# Patient Record
Sex: FEMALE | Race: WHITE | Employment: FULL TIME | URBAN - METROPOLITAN AREA
[De-identification: names, ages, dates, MRNs, and addresses within clinical notes are randomized per-mention and may not be internally consistent; named-entity substitution may affect disease eponyms.]

---

## 2017-10-16 PROCEDURE — 88175 CYTOPATH C/V AUTO FLUID REDO: CPT | Performed by: NURSE PRACTITIONER

## 2017-10-18 PROCEDURE — 82570 ASSAY OF URINE CREATININE: CPT | Performed by: NURSE PRACTITIONER

## 2017-10-18 PROCEDURE — 82043 UR ALBUMIN QUANTITATIVE: CPT | Performed by: NURSE PRACTITIONER

## 2017-10-23 PROBLEM — E11.319: Status: ACTIVE | Noted: 2017-10-23

## 2017-10-23 PROBLEM — IMO0001 UNCONTROLLED DIABETES MELLITUS TYPE 2 WITHOUT COMPLICATIONS: Status: ACTIVE | Noted: 2017-10-23

## 2017-10-23 PROBLEM — IMO0002: Status: ACTIVE | Noted: 2017-10-23

## 2017-10-23 PROBLEM — E11.65: Status: ACTIVE | Noted: 2017-10-23

## 2017-11-21 ENCOUNTER — HOSPITAL ENCOUNTER (OUTPATIENT)
Dept: MAMMOGRAPHY | Facility: HOSPITAL | Age: 52
Discharge: HOME | End: 2017-11-21
Attending: NURSE PRACTITIONER
Payer: COMMERCIAL

## 2017-11-21 DIAGNOSIS — Z12.31 ENCOUNTER FOR SCREENING MAMMOGRAM FOR MALIGNANT NEOPLASM OF BREAST: ICD-10-CM

## 2017-11-21 PROCEDURE — 77067 SCR MAMMO BI INCL CAD: CPT | Performed by: NURSE PRACTITIONER

## 2017-12-29 ENCOUNTER — CHARTING TRANS (OUTPATIENT)
Dept: OTHER | Age: 52
End: 2017-12-29

## 2017-12-29 ENCOUNTER — LAB SERVICES (OUTPATIENT)
Dept: OTHER | Age: 52
End: 2017-12-29

## 2017-12-29 LAB — RAPID STREP GROUP A: NORMAL

## 2018-07-05 PROBLEM — E11.319: Status: ACTIVE | Noted: 2017-10-23

## 2018-11-02 VITALS
DIASTOLIC BLOOD PRESSURE: 76 MMHG | OXYGEN SATURATION: 99 % | TEMPERATURE: 97.8 F | HEIGHT: 66 IN | WEIGHT: 166.15 LBS | BODY MASS INDEX: 26.7 KG/M2 | RESPIRATION RATE: 18 BRPM | HEART RATE: 93 BPM | SYSTOLIC BLOOD PRESSURE: 122 MMHG

## 2019-07-05 ENCOUNTER — LAB ENCOUNTER (OUTPATIENT)
Dept: LAB | Facility: HOSPITAL | Age: 54
End: 2019-07-05
Attending: NURSE PRACTITIONER
Payer: COMMERCIAL

## 2019-07-05 DIAGNOSIS — E78.5 HYPERLIPIDEMIA, UNSPECIFIED HYPERLIPIDEMIA TYPE: ICD-10-CM

## 2019-07-05 DIAGNOSIS — E11.319 CONTROLLED TYPE 2 DIABETES MELLITUS WITH RETINOPATHY OF BOTH EYES, WITHOUT LONG-TERM CURRENT USE OF INSULIN, MACULAR EDEMA PRESENCE UNSPECIFIED, UNSPECIFIED RETINOPATHY SEVERITY (HCC): ICD-10-CM

## 2019-07-05 LAB
CHOLEST SMN-MCNC: 161 MG/DL (ref ?–200)
EST. AVERAGE GLUCOSE BLD GHB EST-MCNC: 126 MG/DL (ref 68–126)
HBA1C MFR BLD HPLC: 6 % (ref ?–5.7)
HDLC SERPL-MCNC: 78 MG/DL (ref 40–59)
LDLC SERPL CALC-MCNC: 68 MG/DL (ref ?–100)
NONHDLC SERPL-MCNC: 83 MG/DL (ref ?–130)
PATIENT FASTING: NO
TRIGL SERPL-MCNC: 77 MG/DL (ref 30–149)
VLDLC SERPL CALC-MCNC: 15 MG/DL (ref 0–30)

## 2019-07-05 PROCEDURE — 80061 LIPID PANEL: CPT

## 2019-07-05 PROCEDURE — 36415 COLL VENOUS BLD VENIPUNCTURE: CPT

## 2019-07-05 PROCEDURE — 83036 HEMOGLOBIN GLYCOSYLATED A1C: CPT

## 2019-07-08 NOTE — PROGRESS NOTES
Please let her know A1c is 6.0 and cholesterol is better on medication. She needs a follow up visit with me.  I have not seen her since November

## 2019-10-23 ENCOUNTER — HOSPITAL ENCOUNTER (OUTPATIENT)
Dept: GENERAL RADIOLOGY | Facility: HOSPITAL | Age: 54
Discharge: HOME | End: 2019-10-23
Attending: NURSE PRACTITIONER
Payer: COMMERCIAL

## 2019-10-23 ENCOUNTER — HOSPITAL ENCOUNTER (OUTPATIENT)
Dept: MAMMOGRAPHY | Facility: HOSPITAL | Age: 54
Discharge: HOME | End: 2019-10-23
Attending: INTERNAL MEDICINE
Payer: COMMERCIAL

## 2019-10-23 DIAGNOSIS — Z12.31 SCREENING MAMMOGRAM, ENCOUNTER FOR: ICD-10-CM

## 2019-10-23 DIAGNOSIS — M53.3 PAIN IN THE COCCYX: ICD-10-CM

## 2019-10-23 DIAGNOSIS — E11.319 CONTROLLED TYPE 2 DIABETES MELLITUS WITH RETINOPATHY OF BOTH EYES, WITHOUT LONG-TERM CURRENT USE OF INSULIN, MACULAR EDEMA PRESENCE UNSPECIFIED, UNSPECIFIED RETINOPATHY SEVERITY (HCC): ICD-10-CM

## 2019-10-23 DIAGNOSIS — M54.2 CERVICAL PAIN (NECK): ICD-10-CM

## 2019-10-23 PROCEDURE — 72220 X-RAY EXAM SACRUM TAILBONE: CPT | Performed by: NURSE PRACTITIONER

## 2019-10-23 PROCEDURE — 77063 BREAST TOMOSYNTHESIS BI: CPT | Performed by: NURSE PRACTITIONER

## 2019-10-23 PROCEDURE — 77067 SCR MAMMO BI INCL CAD: CPT | Performed by: NURSE PRACTITIONER

## 2019-12-04 PROBLEM — Z80.0 FAMILY HISTORY OF COLON CANCER: Status: ACTIVE | Noted: 2019-12-04

## 2021-02-12 PROBLEM — B00.1 COLD SORE: Status: ACTIVE | Noted: 2021-02-12

## 2021-09-03 ENCOUNTER — HOSPITAL ENCOUNTER (OUTPATIENT)
Dept: MAMMOGRAPHY | Facility: HOSPITAL | Age: 56
Discharge: HOME | End: 2021-09-03
Attending: NURSE PRACTITIONER
Payer: COMMERCIAL

## 2021-09-03 ENCOUNTER — HOSPITAL ENCOUNTER (OUTPATIENT)
Dept: GENERAL RADIOLOGY | Facility: HOSPITAL | Age: 56
Discharge: HOME | End: 2021-09-03
Attending: NURSE PRACTITIONER
Payer: COMMERCIAL

## 2021-09-03 DIAGNOSIS — R52 PAIN: ICD-10-CM

## 2021-09-03 DIAGNOSIS — Z12.31 ENCOUNTER FOR SCREENING MAMMOGRAM FOR MALIGNANT NEOPLASM OF BREAST: ICD-10-CM

## 2021-09-03 PROCEDURE — 77063 BREAST TOMOSYNTHESIS BI: CPT | Performed by: NURSE PRACTITIONER

## 2021-09-03 PROCEDURE — 77067 SCR MAMMO BI INCL CAD: CPT | Performed by: NURSE PRACTITIONER

## 2021-09-03 PROCEDURE — 73562 X-RAY EXAM OF KNEE 3: CPT | Performed by: NURSE PRACTITIONER

## 2021-10-22 ENCOUNTER — LAB ENCOUNTER (OUTPATIENT)
Dept: LAB | Facility: HOSPITAL | Age: 56
End: 2021-10-22
Attending: NURSE PRACTITIONER
Payer: COMMERCIAL

## 2021-10-22 DIAGNOSIS — R00.2 PALPITATION: ICD-10-CM

## 2021-10-22 DIAGNOSIS — R07.89 CHEST PRESSURE: ICD-10-CM

## 2021-10-22 DIAGNOSIS — Z13.6 SCREENING FOR HEART DISEASE: ICD-10-CM

## 2021-10-22 DIAGNOSIS — E78.5 HYPERLIPIDEMIA, UNSPECIFIED HYPERLIPIDEMIA TYPE: ICD-10-CM

## 2021-10-22 DIAGNOSIS — E11.319 CONTROLLED TYPE 2 DIABETES MELLITUS WITH RETINOPATHY OF BOTH EYES, WITHOUT LONG-TERM CURRENT USE OF INSULIN, MACULAR EDEMA PRESENCE UNSPECIFIED, UNSPECIFIED RETINOPATHY SEVERITY (HCC): ICD-10-CM

## 2021-10-22 PROCEDURE — 80048 BASIC METABOLIC PNL TOTAL CA: CPT

## 2021-10-22 PROCEDURE — 83735 ASSAY OF MAGNESIUM: CPT

## 2021-10-22 PROCEDURE — 84443 ASSAY THYROID STIM HORMONE: CPT

## 2021-10-22 PROCEDURE — 84484 ASSAY OF TROPONIN QUANT: CPT

## 2021-10-22 PROCEDURE — 36415 COLL VENOUS BLD VENIPUNCTURE: CPT

## 2024-03-09 ENCOUNTER — HOSPITAL ENCOUNTER (OUTPATIENT)
Dept: MAMMOGRAPHY | Facility: HOSPITAL | Age: 59
Discharge: HOME | End: 2024-03-09
Attending: NURSE PRACTITIONER
Payer: COMMERCIAL

## 2024-03-09 DIAGNOSIS — Z12.31 ENCOUNTER FOR MAMMOGRAM TO ESTABLISH BASELINE MAMMOGRAM: ICD-10-CM

## 2024-03-09 PROCEDURE — 77067 SCR MAMMO BI INCL CAD: CPT | Performed by: NURSE PRACTITIONER

## 2024-03-09 PROCEDURE — 77063 BREAST TOMOSYNTHESIS BI: CPT | Performed by: NURSE PRACTITIONER

## 2025-02-27 ENCOUNTER — OFFICE VISIT (OUTPATIENT)
Dept: FAMILY MEDICINE CLINIC | Facility: CLINIC | Age: 60
End: 2025-02-27
Payer: COMMERCIAL

## 2025-02-27 ENCOUNTER — NURSE ONLY (OUTPATIENT)
Dept: LAB | Age: 60
End: 2025-02-27
Attending: NURSE PRACTITIONER
Payer: COMMERCIAL

## 2025-02-27 VITALS
OXYGEN SATURATION: 99 % | HEART RATE: 70 BPM | TEMPERATURE: 99 F | RESPIRATION RATE: 16 BRPM | DIASTOLIC BLOOD PRESSURE: 70 MMHG | BODY MASS INDEX: 28.77 KG/M2 | WEIGHT: 179 LBS | HEIGHT: 66 IN | SYSTOLIC BLOOD PRESSURE: 112 MMHG

## 2025-02-27 DIAGNOSIS — J06.9 VIRAL URI WITH COUGH: ICD-10-CM

## 2025-02-27 DIAGNOSIS — J06.9 VIRAL URI WITH COUGH: Primary | ICD-10-CM

## 2025-02-27 LAB
POCT INFLUENZA A: NEGATIVE
POCT INFLUENZA B: NEGATIVE

## 2025-02-27 PROCEDURE — 99203 OFFICE O/P NEW LOW 30 MIN: CPT | Performed by: NURSE PRACTITIONER

## 2025-02-27 PROCEDURE — 87502 INFLUENZA DNA AMP PROBE: CPT

## 2025-02-27 RX ORDER — BENZONATATE 200 MG/1
200 CAPSULE ORAL 3 TIMES DAILY PRN
Qty: 30 CAPSULE | Refills: 0 | Status: SHIPPED | OUTPATIENT
Start: 2025-02-27

## 2025-02-27 RX ORDER — ALBUTEROL SULFATE 90 UG/1
2 INHALANT RESPIRATORY (INHALATION)
Qty: 1 EACH | Refills: 0 | Status: SHIPPED | OUTPATIENT
Start: 2025-02-27

## 2025-02-27 NOTE — PATIENT INSTRUCTIONS
Instruction for viral upper respiratory infections:  Your child has a viral upper respiratory illness (URI), which is another term for the common cold. The virus is contagious during the first 4-5 days. It is spread through the air by coughing, sneezing, or by direct contact (touching your sick child then touching your own eyes, nose, or mouth). Sore throat is a common accompanying symptom. Frequent handwashing will decrease risk of spread. Most viral illnesses resolve within 7 to 14 days with rest and simple home remedies. However, they may sometimes last up to 4 weeks. Expect the cough to gradually worsen the first 4-5 days, then peak and slowly go away. The nasal mucous can become thick, yellow or yellow/green during the last half of the cold (but should not last past day 14 of the cold). Antibiotics will not kill a virus and are not prescribed for this condition.    General comfort measures:  Get rest!  Hydrate! (cold or hot based on comfort). Drink lots of water or other non dehydrating liquids to help with illness. Salty foods, soups and tea can help with throat pain.   Hand washing-use hand  or wash hands frequently, cover your cough or sneeze, do not share towels or drinks with others.  Salt water gargles (1 tsp. Salt in 6 oz lukewarm water): gargle for 2 minutes, repeat every 15 minutes as needed to help decrease swelling and relieve pain.  Use humidified air, steamy showers/baths and use vaporizer in sleeping quarters to keep secretions thin.  Avoid smoking.    Symptom management:    Nasal congestion/Post-nasal-drip: Saline nasal spray to nostrils to help remove drainage or an antihistamine to help dry up drainage.    Sinus congestion/Post-nasal-drip: OTC Nasacort or Flonase (steroid nasal spray) nightly for 2 weeks. May take Sudafed (D) or Sudafed-PE, if not contraindicated (do not take if you have HTN).   Pain/discomfort:  May use Tylenol or Ibuprofen, if not contraindicated.  Cough:  May take  DM-dextromethorophan over the counter (long lasting). Ex: Delsym  Chest congestion:  May take guaifenesin with a lot of water.  Ex:  Plain Mucinex.  Sore throat:  Cepacol lozenges or Chloroseptic throat spray (active ingredient Benzocaine).      Follow up with your PCP in 1-2 weeks if not better.  Follow up in a few days if worsening symptoms. Seek immediate care if inability to swallow or breathe.

## 2025-02-27 NOTE — PROGRESS NOTES
CHIEF COMPLAINT:     Chief Complaint   Patient presents with    Cough     Deep congestion that could be bronchitis or pneumonia - Entered by patient  Started Monday; productive cough, chest congestion, tested negative for Covid at home        HPI:   Cyndy Julien is a 59 year old female who presents for upper respiratory symptoms for  3 days. Patient reports congestion, cough .  Associated symptoms include fatigue, body aches, headache, runny nose, dry to productive cough, occ wheezing, poor sleep last night d/t cough.  Denies fever, sob, chest/back pain.  Has hx of pneumonia/bronchitis, maybe 3 yrs ago.  Symptoms have been progressing since onset.  Treating symptoms with mucinex-dm.       + hx of COVID 2 yrs ago; No known COVID exposure. Home covid test 2 days ago was negative.  Son was sick last week with similar symptoms.  + flu shot.      Other conditions:   BMI: Body mass index is 28.89 kg/m².  Heart Disease: no  DM: yes  Chronic Lung Disease: no, former smoker.  Chronic Kidney Disease: no  Liver Disease: no  Immunocompromised: (Transplant, Heme-Onc, Chronic Steroid Use): no    Current Outpatient Medications   Medication Sig Dispense Refill    rosuvastatin 10 MG Oral Tab Take 1 tablet (10 mg total) by mouth nightly. 90 tablet 3    metFORMIN HCl  MG Oral Tablet 24 Hr TAKE 1 TABLET BY MOUTH TWICE DAILY WITH MEALS 180 tablet 3    Glucose Blood (ONETOUCH VERIO) In Vitro Strip CHECK GLUCOSE THREE TO FOUR TIMES DAILY AS DIRECTED 150 strip 3    aspirin 81 MG Oral Tab EC Take 1 tablet (81 mg total) by mouth daily. 1 tablet 0      Past Medical History:    Diabetes (HCC)    History of pneumonia    per [facility]:      Past Surgical History:   Procedure Laterality Date    Cataract      Colonoscopy  12/2013    Colonoscopy  12/2019    Colonoscopy N/A 12/4/2019    Procedure: COLONOSCOPY, POSSIBLE BIOPSY, POSSIBLE POLYPECTOMY 48651;  Surgeon: Naresh Stewart MD;  [place] [facility]    Lasik      per  [facility]:    Tubal ligation  1996         Social History     Socioeconomic History    Marital status:    Tobacco Use    Smoking status: Former    Smokeless tobacco: Never    Tobacco comments:     30 years ago   Vaping Use    Vaping status: Never Used   Substance and Sexual Activity    Alcohol use: Yes     Alcohol/week: 0.8 standard drinks of alcohol     Types: 1 Standard drinks or equivalent per week    Drug use: Never    Sexual activity: Yes     Partners: Male   Other Topics Concern    Caffeine Concern Yes     Comment: coffee, 4 cups         REVIEW OF SYSTEMS:   GENERAL: feels well otherwise,   adequate appetite  SKIN: no rashes or abnormal skin lesions  HEENT: See HPI  LUNGS: denies shortness of breath, See HPI  CARDIOVASCULAR: denies chest pain or palpitations   GI: denies N/V/C or abdominal pain  NEURO: denies dizziness or lightheadedness    EXAM:   /70   Pulse 70   Temp 98.5 °F (36.9 °C) (Oral)   Resp 16   Ht 5' 6\" (1.676 m)   Wt 179 lb (81.2 kg)   LMP 09/11/2020   SpO2 99%   BMI 28.89 kg/m²     Physical Exam  Vitals reviewed.   Constitutional:       General: She is not in acute distress.     Appearance: Normal appearance. She is not ill-appearing.   HENT:      Head: Normocephalic and atraumatic.      Right Ear: Tympanic membrane and ear canal normal.      Left Ear: Tympanic membrane and ear canal normal.      Nose: Congestion and rhinorrhea present. Rhinorrhea is clear.      Right Sinus: No maxillary sinus tenderness or frontal sinus tenderness.      Left Sinus: No maxillary sinus tenderness or frontal sinus tenderness.      Mouth/Throat:      Lips: Pink.      Mouth: Mucous membranes are moist.      Pharynx: Oropharynx is clear. Uvula midline. Postnasal drip present. No posterior oropharyngeal erythema.   Eyes:      Extraocular Movements: Extraocular movements intact.      Conjunctiva/sclera: Conjunctivae normal.   Cardiovascular:      Rate and Rhythm: Normal rate and regular rhythm.       Heart sounds: Normal heart sounds. No murmur heard.  Pulmonary:      Effort: Pulmonary effort is normal.      Breath sounds: Normal breath sounds and air entry. No decreased breath sounds, wheezing, rhonchi or rales.   Musculoskeletal:      Cervical back: Normal range of motion and neck supple.   Lymphadenopathy:      Cervical: No cervical adenopathy.   Skin:     General: Skin is warm and dry.   Neurological:      General: No focal deficit present.      Mental Status: She is alert.   Psychiatric:         Speech: Speech normal.         Behavior: Behavior normal. Behavior is cooperative.          Recent Results (from the past 24 hours)   POCT Flu Test ([facility] or [facility])    Collection Time: 02/27/25  7:30 AM    Specimen: Nares; Other   Result Value Ref Range    POCT INFLUENZA A Negative Negative    POCT INFLUENZA B Negative Negative       ASSESSMENT AND PLAN:   Cyndy Julien is a 59 year old female who presents with     ASSESSMENT:   Encounter Diagnosis   Name Primary?    Viral URI with cough Yes       PLAN:   Discussed likely viral etiology.   Reviewed symptom relief measures with patient.   Monitor for worsening symptoms.  Comfort care as described in Patient Instructions  Medications as below.      Meds & Refills for this Visit:  Requested Prescriptions     Signed Prescriptions Disp Refills    benzonatate 200 MG Oral Cap 30 capsule 0     Sig: Take 1 capsule (200 mg total) by mouth 3 (three) times daily as needed for cough.    albuterol 108 (90 Base) MCG/ACT Inhalation Aero Soln 1 each 0     Sig: Inhale 2 puffs into the lungs every 4 to 6 hours as needed for Wheezing.       Risks, benefits, and side effects of medication explained and discussed.  To f/u with PCP if sx do not resolve as anticipated.  The patient indicates understanding of these issues and agrees to the plan.        Patient Instructions     Instruction for viral upper respiratory infections:  Your child has a viral upper  respiratory illness (URI), which is another term for the common cold. The virus is contagious during the first 4-5 days. It is spread through the air by coughing, sneezing, or by direct contact (touching your sick child then touching your own eyes, nose, or mouth). Sore throat is a common accompanying symptom. Frequent handwashing will decrease risk of spread. Most viral illnesses resolve within 7 to 14 days with rest and simple home remedies. However, they may sometimes last up to 4 weeks. Expect the cough to gradually worsen the first 4-5 days, then peak and slowly go away. The nasal mucous can become thick, yellow or yellow/green during the last half of the cold (but should not last past day 14 of the cold). Antibiotics will not kill a virus and are not prescribed for this condition.    General comfort measures:  Get rest!  Hydrate! (cold or hot based on comfort). Drink lots of water or other non dehydrating liquids to help with illness. Salty foods, soups and tea can help with throat pain.   Hand washing-use hand  or wash hands frequently, cover your cough or sneeze, do not share towels or drinks with others.  Salt water gargles (1 tsp. Salt in 6 oz lukewarm water): gargle for 2 minutes, repeat every 15 minutes as needed to help decrease swelling and relieve pain.  Use humidified air, steamy showers/baths and use vaporizer in sleeping quarters to keep secretions thin.  Avoid smoking.    Symptom management:    Nasal congestion/Post-nasal-drip: Saline nasal spray to nostrils to help remove drainage or an antihistamine to help dry up drainage.    Sinus congestion/Post-nasal-drip: OTC Nasacort or Flonase (steroid nasal spray) nightly for 2 weeks. May take Sudafed (D) or Sudafed-PE, if not contraindicated (do not take if you have HTN).   Pain/discomfort:  May use Tylenol or Ibuprofen, if not contraindicated.  Cough:  May take DM-dextromethorophan over the counter (long lasting). Ex: Delsym  Chest congestion:   May take guaifenesin with a lot of water.  Ex:  Plain Mucinex.  Sore throat:  Cepacol lozenges or Chloroseptic throat spray (active ingredient Benzocaine).      Follow up with your PCP in 1-2 weeks if not better.  Follow up in a few days if worsening symptoms. Seek immediate care if inability to swallow or breathe.

## 2025-06-20 ENCOUNTER — HOSPITAL ENCOUNTER (OUTPATIENT)
Dept: MAMMOGRAPHY | Facility: HOSPITAL | Age: 60
Discharge: HOME | End: 2025-06-20
Attending: NURSE PRACTITIONER
Payer: COMMERCIAL

## 2025-06-20 DIAGNOSIS — Z12.31 SCREENING MAMMOGRAM, ENCOUNTER FOR: ICD-10-CM

## 2025-06-20 PROCEDURE — 77063 BREAST TOMOSYNTHESIS BI: CPT | Performed by: NURSE PRACTITIONER

## 2025-06-20 PROCEDURE — 77067 SCR MAMMO BI INCL CAD: CPT | Performed by: NURSE PRACTITIONER

## (undated) NOTE — LETTER
Date: 2/27/2025    Patient Name: Cyndy Julein          To Whom it may concern:    This letter has been written at the patient's request. The above patient was seen at [facility] for treatment of a medical condition.    This patient should be excused from attending work/school from 2/25/25 through 2/27/25.    The patient may return to work/school on 2/28/25 with the following limitations none.        Sincerely,      GEORGINA Frost, FNP-C  [facility]  02/27/25  7:35 AM